# Patient Record
Sex: MALE | Race: BLACK OR AFRICAN AMERICAN | NOT HISPANIC OR LATINO | URBAN - METROPOLITAN AREA
[De-identification: names, ages, dates, MRNs, and addresses within clinical notes are randomized per-mention and may not be internally consistent; named-entity substitution may affect disease eponyms.]

---

## 2023-04-14 ENCOUNTER — EMERGENCY (EMERGENCY)
Facility: HOSPITAL | Age: 18
LOS: 0 days | Discharge: HOME | End: 2023-04-14
Attending: EMERGENCY MEDICINE
Payer: OTHER GOVERNMENT

## 2023-04-14 VITALS
TEMPERATURE: 99 F | RESPIRATION RATE: 18 BRPM | HEART RATE: 104 BPM | SYSTOLIC BLOOD PRESSURE: 130 MMHG | DIASTOLIC BLOOD PRESSURE: 69 MMHG

## 2023-04-14 DIAGNOSIS — M79.675 PAIN IN LEFT TOE(S): ICD-10-CM

## 2023-04-14 PROCEDURE — 99284 EMERGENCY DEPT VISIT MOD MDM: CPT

## 2023-04-14 PROCEDURE — 73630 X-RAY EXAM OF FOOT: CPT | Mod: LT

## 2023-04-14 PROCEDURE — 99283 EMERGENCY DEPT VISIT LOW MDM: CPT | Mod: 25

## 2023-04-14 PROCEDURE — 73630 X-RAY EXAM OF FOOT: CPT | Mod: 26,LT

## 2023-04-14 NOTE — ED PROVIDER NOTE - OBJECTIVE STATEMENT
17-year-old male presents emergency department for left fifth toe pain that started last February.  Patient in [employer] and he states that the pain is worse when he runs and was palpation.  He told his supervisor told him to come to the emergency department.  No trauma no falls no discharge no erythema.  Patient is not on anything for pain at this time.

## 2023-04-14 NOTE — ED PROVIDER NOTE - NSFOLLOWUPCLINICS_GEN_ALL_ED_FT
[facility]  Podiatry  .  [place]   Phone: [phone]  Fax:   Follow Up Time: Routine

## 2023-04-14 NOTE — ED PROVIDER NOTE - PATIENT PORTAL LINK FT
You can access the FollowMyHealth Patient Portal offered by [facility] by registering at the following website: [email or web address]. By joining [employer]’s FollowMyHealth portal, you will also be able to view your health information using other applications (apps) compatible with our system.

## 2023-04-14 NOTE — ED PROVIDER NOTE - CLINICAL SUMMARY MEDICAL DECISION MAKING FREE TEXT BOX
18 yo M presented to ED for L 5th toe pain. No fracture on xray. toes were dee taped and pt give podiatry fu. dc home with strict return precautions.

## 2023-04-14 NOTE — ED PROVIDER NOTE - NSFOLLOWUPINSTRUCTIONS_ED_ALL_ED_FT
[employer] CareNotes®     :  [facility]             FOOT CONTUSION - AfterCare(R) Instructions(ER/ED)     Foot Contusion    WHAT YOU NEED TO KNOW:    A foot contusion is a bruise to the foot.    DISCHARGE INSTRUCTIONS:    Medicines:     NSAIDs: These medicines decrease swelling and pain. NSAIDs are available without a doctor's order. Ask your healthcare provider which medicine is right for you. Ask how much to take and when to take it. Take as directed. NSAIDs can cause stomach bleeding and kidney problems if not taken correctly.      Take your medicine as directed. Contact your healthcare provider if you think your medicine is not helping or if you have side effects. Tell him of her if you are allergic to any medicine. Keep a list of the medicines, vitamins, and herbs you take. Include the amounts, and when and why you take them. Bring the list or the pill bottles to follow-up visits. Carry your medicine list with you in case of an emergency.    Follow up with your healthcare provider as directed: Write down your questions so you remember to ask them during your visits.     Care for your foot: Follow your treatment plan to help decrease your pain and improve your muscle movement.     Rest: You will need to rest your foot for 1 to 2 days after your injury. This will help decrease the risk of more damage.      Ice: Ice helps decrease swelling and pain. Ice may also help prevent tissue damage. Use an ice pack, or put crushed ice in a plastic bag. Cover it with a towel and place it on your foot for 15 to 20 minutes every hour or as directed.      Compression: Compression (tight hold) provides support and helps decrease swelling and movement so your foot can heal. You may be told to keep your foot wrapped with a tight elastic bandage. Follow instructions about how to apply your bandage. Do not massage your foot. You could cause more damage or pain.      Elevation: Keep your foot raised above the level of your heart while you are sitting or lying down. This will help decrease or limit swelling. Use pillows, blankets, or rolled towels to elevate your foot comfortably.    Exercise your foot: You may be given gentle exercises to improve your foot movement and help decrease stiffness. Ask when you can return to your normal activities or sports.    Prevent another injury:     Wear equipment to protect yourself when you play sports.      Make sure your shoes fit properly.      Always wear shoes on streets or sidewalks.      Clean spills off the floor right away to avoid slipping or hitting your foot.      Make sure your home is well lit when you get up during the night. This will help you avoid hurting your foot in the dark.    Contact your healthcare provider if:     You have increased swelling on your foot.      You have severe foot pain.      You are not able to move your foot.      You have questions or concerns about your injury or treatment.         © Copyright [employer] 2019       back to top                      © Copyright [employer] 2019 Our Emergency Department Referral Coordinators will be reaching out to you in the next 24-48 hours from 9:00am to 5:00pm with a follow up appointment. Please expect a phone call from the hospital in that time frame. If you do not receive a call or if you have any questions or concerns, you can reach them at [phone] or [phone].      FOOT CONTUSION - AfterCare(R) Instructions(ER/ED)     Foot Contusion    WHAT YOU NEED TO KNOW:    A foot contusion is a bruise to the foot.    DISCHARGE INSTRUCTIONS:    Medicines:     NSAIDs: These medicines decrease swelling and pain. NSAIDs are available without a doctor's order. Ask your healthcare provider which medicine is right for you. Ask how much to take and when to take it. Take as directed. NSAIDs can cause stomach bleeding and kidney problems if not taken correctly.      Take your medicine as directed. Contact your healthcare provider if you think your medicine is not helping or if you have side effects. Tell him of her if you are allergic to any medicine. Keep a list of the medicines, vitamins, and herbs you take. Include the amounts, and when and why you take them. Bring the list or the pill bottles to follow-up visits. Carry your medicine list with you in case of an emergency.    Follow up with your healthcare provider as directed: Write down your questions so you remember to ask them during your visits.     Care for your foot: Follow your treatment plan to help decrease your pain and improve your muscle movement.     Rest: You will need to rest your foot for 1 to 2 days after your injury. This will help decrease the risk of more damage.      Ice: Ice helps decrease swelling and pain. Ice may also help prevent tissue damage. Use an ice pack, or put crushed ice in a plastic bag. Cover it with a towel and place it on your foot for 15 to 20 minutes every hour or as directed.      Compression: Compression (tight hold) provides support and helps decrease swelling and movement so your foot can heal. You may be told to keep your foot wrapped with a tight elastic bandage. Follow instructions about how to apply your bandage. Do not massage your foot. You could cause more damage or pain.      Elevation: Keep your foot raised above the level of your heart while you are sitting or lying down. This will help decrease or limit swelling. Use pillows, blankets, or rolled towels to elevate your foot comfortably.    Exercise your foot: You may be given gentle exercises to improve your foot movement and help decrease stiffness. Ask when you can return to your normal activities or sports.    Prevent another injury:     Wear equipment to protect yourself when you play sports.      Make sure your shoes fit properly.      Always wear shoes on streets or sidewalks.      Clean spills off the floor right away to avoid slipping or hitting your foot.      Make sure your home is well lit when you get up during the night. This will help you avoid hurting your foot in the dark.    Contact your healthcare provider if:     You have increased swelling on your foot.      You have severe foot pain.      You are not able to move your foot.      You have questions or concerns about your injury or treatment.         © Copyright [employer] 2019       back to top                      © Copyright [employer] 2019

## 2023-04-14 NOTE — ED PEDIATRIC NURSE NOTE - NS ED NURSE RECORD ANOTHER VITAL SIGN
You can access the FollowMyHealth Patient Portal offered by [facility] by registering at the following website: [email or web address]. By joining [employer]’s FollowMyHealth portal, you will also be able to view your health information using other applications (apps) compatible with our system.
Yes

## 2023-12-29 NOTE — ED PEDIATRIC NURSE NOTE - CAS EDP DISCH TYPE
Refill Routing Note   Medication(s) are not appropriate for processing by [facility] for the following reason(s):        Responsible provider unclear    [facility] action(s):  Route             Appointments  past 12m or future 3m with PCP    Date Provider   Last Visit   9/12/2023 Mirna Scott PA-C   Next Visit   Visit date not found Mirna Scott PA-C   ED visits in past 90 days: 0        Note composed:9:12 AM 12/29/2023          
Home